# Patient Record
Sex: MALE | Race: WHITE | NOT HISPANIC OR LATINO | Employment: FULL TIME | URBAN - NONMETROPOLITAN AREA
[De-identification: names, ages, dates, MRNs, and addresses within clinical notes are randomized per-mention and may not be internally consistent; named-entity substitution may affect disease eponyms.]

---

## 2020-09-09 ENCOUNTER — HOSPITAL ENCOUNTER (OUTPATIENT)
Dept: RADIOLOGY | Facility: HOSPITAL | Age: 50
Discharge: HOME | End: 2020-09-09
Attending: NURSE PRACTITIONER
Payer: MEDICAID

## 2020-09-09 DIAGNOSIS — M54.50 LUMBAGO: ICD-10-CM

## 2020-09-09 DIAGNOSIS — M54.50 LUMBAGO: Primary | ICD-10-CM

## 2020-09-09 PROCEDURE — 72100 X-RAY EXAM L-S SPINE 2/3 VWS: CPT | Mod: TC

## 2021-02-23 ENCOUNTER — HOSPITAL ENCOUNTER (OUTPATIENT)
Dept: RADIOLOGY | Facility: HOSPITAL | Age: 51
Discharge: HOME | End: 2021-02-23
Attending: FAMILY MEDICINE
Payer: MEDICAID

## 2021-02-23 DIAGNOSIS — M54.12 CERVICAL RADICULITIS: ICD-10-CM

## 2021-02-23 DIAGNOSIS — M54.12 CERVICAL RADICULITIS: Primary | ICD-10-CM

## 2021-02-23 PROCEDURE — 73030 X-RAY EXAM OF SHOULDER: CPT | Mod: TC,LT

## 2021-02-23 PROCEDURE — 72050 X-RAY EXAM NECK SPINE 4/5VWS: CPT | Mod: TC

## 2021-11-29 ENCOUNTER — LAB VISIT (OUTPATIENT)
Dept: LAB | Facility: HOSPITAL | Age: 51
End: 2021-11-29
Attending: NURSE PRACTITIONER
Payer: MEDICAID

## 2021-11-29 DIAGNOSIS — B82.9 INTESTINAL PARASITISM, UNSPECIFIED: ICD-10-CM

## 2021-11-29 DIAGNOSIS — R10.84 ABDOMINAL PAIN, GENERALIZED: Primary | ICD-10-CM

## 2021-11-29 PROCEDURE — 87427 SHIGA-LIKE TOXIN AG IA: CPT | Mod: 59 | Performed by: NURSE PRACTITIONER

## 2021-11-29 PROCEDURE — 87449 NOS EACH ORGANISM AG IA: CPT | Performed by: NURSE PRACTITIONER

## 2021-11-29 PROCEDURE — 87324 CLOSTRIDIUM AG IA: CPT | Mod: 59 | Performed by: NURSE PRACTITIONER

## 2021-11-29 PROCEDURE — 87045 FECES CULTURE AEROBIC BACT: CPT | Performed by: NURSE PRACTITIONER

## 2021-11-29 PROCEDURE — 87046 STOOL CULTR AEROBIC BACT EA: CPT | Performed by: NURSE PRACTITIONER

## 2021-11-29 PROCEDURE — 87493 C DIFF AMPLIFIED PROBE: CPT | Performed by: NURSE PRACTITIONER

## 2021-11-29 PROCEDURE — 87209 SMEAR COMPLEX STAIN: CPT | Performed by: NURSE PRACTITIONER

## 2021-11-29 PROCEDURE — 87177 OVA AND PARASITES SMEARS: CPT | Performed by: NURSE PRACTITIONER

## 2021-11-30 LAB
C DIFF GDH STL QL: POSITIVE
C DIFF TOX A+B STL QL IA: NEGATIVE
C DIFF TOX GENS STL QL NAA+PROBE: POSITIVE

## 2021-12-01 LAB
E COLI SXT1 STL QL IA: NEGATIVE
E COLI SXT2 STL QL IA: NEGATIVE
O+P STL MICRO: NORMAL

## 2021-12-03 LAB — BACTERIA STL CULT: NORMAL

## 2022-08-20 ENCOUNTER — HOSPITAL ENCOUNTER (EMERGENCY)
Facility: HOSPITAL | Age: 52
Discharge: HOME | End: 2022-08-20
Attending: STUDENT IN AN ORGANIZED HEALTH CARE EDUCATION/TRAINING PROGRAM
Payer: MEDICAID

## 2022-08-20 VITALS
WEIGHT: 185 LBS | SYSTOLIC BLOOD PRESSURE: 141 MMHG | RESPIRATION RATE: 16 BRPM | BODY MASS INDEX: 27.4 KG/M2 | HEIGHT: 69 IN | OXYGEN SATURATION: 97 % | DIASTOLIC BLOOD PRESSURE: 91 MMHG | TEMPERATURE: 99 F | HEART RATE: 82 BPM

## 2022-08-20 DIAGNOSIS — M54.31 SCIATICA OF RIGHT SIDE: Primary | ICD-10-CM

## 2022-08-20 PROCEDURE — 25000003 PHARM REV CODE 250: Performed by: CLINICAL NURSE SPECIALIST

## 2022-08-20 PROCEDURE — 96372 THER/PROPH/DIAG INJ SC/IM: CPT | Performed by: CLINICAL NURSE SPECIALIST

## 2022-08-20 PROCEDURE — 99284 EMERGENCY DEPT VISIT MOD MDM: CPT

## 2022-08-20 PROCEDURE — 63600175 PHARM REV CODE 636 W HCPCS: Performed by: CLINICAL NURSE SPECIALIST

## 2022-08-20 RX ORDER — CYCLOBENZAPRINE HCL 10 MG
10 TABLET ORAL 3 TIMES DAILY PRN
Qty: 15 TABLET | Refills: 0 | Status: SHIPPED | OUTPATIENT
Start: 2022-08-20 | End: 2022-08-20 | Stop reason: SDUPTHER

## 2022-08-20 RX ORDER — KETOROLAC TROMETHAMINE 10 MG/1
10 TABLET, FILM COATED ORAL EVERY 6 HOURS
Qty: 20 TABLET | Refills: 0 | Status: SHIPPED | OUTPATIENT
Start: 2022-08-20 | End: 2022-08-25

## 2022-08-20 RX ORDER — DEXAMETHASONE SODIUM PHOSPHATE 4 MG/ML
8 INJECTION, SOLUTION INTRA-ARTICULAR; INTRALESIONAL; INTRAMUSCULAR; INTRAVENOUS; SOFT TISSUE
Status: COMPLETED | OUTPATIENT
Start: 2022-08-20 | End: 2022-08-20

## 2022-08-20 RX ORDER — TIZANIDINE 4 MG/1
4 TABLET ORAL EVERY 6 HOURS PRN
Qty: 15 TABLET | Refills: 0 | Status: SHIPPED | OUTPATIENT
Start: 2022-08-20 | End: 2022-08-30

## 2022-08-20 RX ORDER — KETOROLAC TROMETHAMINE 30 MG/ML
60 INJECTION, SOLUTION INTRAMUSCULAR; INTRAVENOUS
Status: COMPLETED | OUTPATIENT
Start: 2022-08-20 | End: 2022-08-20

## 2022-08-20 RX ORDER — KETOROLAC TROMETHAMINE 10 MG/1
10 TABLET, FILM COATED ORAL EVERY 6 HOURS
Qty: 20 TABLET | Refills: 0 | Status: SHIPPED | OUTPATIENT
Start: 2022-08-20 | End: 2022-08-20 | Stop reason: SDUPTHER

## 2022-08-20 RX ORDER — METHOCARBAMOL 500 MG/1
1000 TABLET, FILM COATED ORAL
Status: COMPLETED | OUTPATIENT
Start: 2022-08-20 | End: 2022-08-20

## 2022-08-20 RX ORDER — DICLOFENAC SODIUM 50 MG/1
50 TABLET, DELAYED RELEASE ORAL 2 TIMES DAILY
COMMUNITY

## 2022-08-20 RX ORDER — GABAPENTIN 800 MG/1
800 TABLET ORAL 3 TIMES DAILY
COMMUNITY

## 2022-08-20 RX ORDER — CYCLOBENZAPRINE HCL 10 MG
10 TABLET ORAL 3 TIMES DAILY PRN
Qty: 15 TABLET | Refills: 0 | Status: SHIPPED | OUTPATIENT
Start: 2022-08-20 | End: 2022-08-20 | Stop reason: ALTCHOICE

## 2022-08-20 RX ADMIN — METHOCARBAMOL 1000 MG: 500 TABLET ORAL at 09:08

## 2022-08-20 RX ADMIN — DEXAMETHASONE SODIUM PHOSPHATE 8 MG: 4 INJECTION, SOLUTION INTRA-ARTICULAR; INTRALESIONAL; INTRAMUSCULAR; INTRAVENOUS; SOFT TISSUE at 09:08

## 2022-08-20 RX ADMIN — KETOROLAC TROMETHAMINE 60 MG: 30 INJECTION, SOLUTION INTRAMUSCULAR at 09:08

## 2022-08-20 NOTE — ED PROVIDER NOTES
Encounter Date: 8/20/2022       History     Chief Complaint   Patient presents with    Leg Pain     Right leg pain starting in back and radiating down right leg below knee. Hx of sciatica, numbness worsened this week and weakness onset yesterday in right leg.      Shayla Frazier is an 51 y.o. male who complains of lower back pain radiating to right lower extremity.  Patient has a history of chronic sciatica and spine disease.  Patient requesting Ultram for his pain.  Ambulating without any difficulty.        Review of patient's allergies indicates:  No Known Allergies  Past Medical History:   Diagnosis Date    Sciatic leg pain     Spinal disease      No past surgical history on file.  No family history on file.     Review of Systems   Constitutional: Negative for fever.   HENT: Negative for sore throat.    Respiratory: Negative for shortness of breath.    Cardiovascular: Negative for chest pain.   Gastrointestinal: Negative for nausea.   Genitourinary: Negative for dysuria.   Musculoskeletal: Positive for back pain.   Skin: Negative for rash.   Neurological: Negative for weakness.   Hematological: Does not bruise/bleed easily.   All other systems reviewed and are negative.      Physical Exam     Initial Vitals [08/20/22 0923]   BP Pulse Resp Temp SpO2   (!) 163/107 94 (!) 22 98.6 °F (37 °C) 97 %      MAP       --         Physical Exam    Nursing note and vitals reviewed.  Constitutional: He appears well-developed and well-nourished.   HENT:   Head: Normocephalic and atraumatic.   Eyes: Pupils are equal, round, and reactive to light.   Cardiovascular: Normal rate and regular rhythm.   Pulmonary/Chest: Breath sounds normal.   Abdominal: Abdomen is soft. Bowel sounds are normal.   Musculoskeletal:         General: Normal range of motion.     Neurological: He is alert and oriented to person, place, and time.   Psychiatric: He has a normal mood and affect.         ED Course   Procedures  Labs Reviewed - No data to  display       Imaging Results    None          Medications   methocarbamoL tablet 1,000 mg (1,000 mg Oral Given 8/20/22 0935)   ketorolac injection 60 mg (60 mg Intramuscular Given 8/20/22 0935)   dexamethasone injection 8 mg (8 mg Intramuscular Given 8/20/22 0936)     Medical Decision Making:   Differential Diagnosis:   Sciatica, chronic pain syndrome, drug-seeking behavior                      Clinical Impression:   Final diagnoses:  [M54.31] Sciatica of right side (Primary)          ED Disposition Condition    Discharge Stable        ED Prescriptions     Medication Sig Dispense Start Date End Date Auth. Provider    cyclobenzaprine (FLEXERIL) 10 MG tablet  (Status: Discontinued) Take 1 tablet (10 mg total) by mouth 3 (three) times daily as needed for Muscle spasms. 15 tablet 8/20/2022 8/20/2022 Brianna Gomez NP    ketorolac (TORADOL) 10 mg tablet  (Status: Discontinued) Take 1 tablet (10 mg total) by mouth every 6 (six) hours. for 5 days 20 tablet 8/20/2022 8/20/2022 Brianna Gomez NP    cyclobenzaprine (FLEXERIL) 10 MG tablet  (Status: Discontinued) Take 1 tablet (10 mg total) by mouth 3 (three) times daily as needed for Muscle spasms. 15 tablet 8/20/2022 8/20/2022 Brianna Gomez NP    ketorolac (TORADOL) 10 mg tablet Take 1 tablet (10 mg total) by mouth every 6 (six) hours. for 5 days 20 tablet 8/20/2022 8/25/2022 Brianna Gomez NP    tiZANidine (ZANAFLEX) 4 MG tablet Take 1 tablet (4 mg total) by mouth every 6 (six) hours as needed (back pain). 15 tablet 8/20/2022 8/30/2022 Brianna Gomez NP        Follow-up Information     Follow up With Specialties Details Why Contact Info    Kierra Gomez NP Family Medicine  As needed [facility] [address]  [phone]             Brianna Gomez NP  08/20/22 6539

## 2023-05-31 ENCOUNTER — HOSPITAL ENCOUNTER (EMERGENCY)
Facility: HOSPITAL | Age: 53
Discharge: HOME | End: 2023-05-31
Attending: EMERGENCY MEDICINE
Payer: MEDICAID

## 2023-05-31 VITALS
HEIGHT: 69 IN | SYSTOLIC BLOOD PRESSURE: 118 MMHG | WEIGHT: 186 LBS | BODY MASS INDEX: 27.55 KG/M2 | HEART RATE: 69 BPM | DIASTOLIC BLOOD PRESSURE: 79 MMHG | TEMPERATURE: 98 F | OXYGEN SATURATION: 99 % | RESPIRATION RATE: 18 BRPM

## 2023-05-31 DIAGNOSIS — M54.2 NECK PAIN: Primary | ICD-10-CM

## 2023-05-31 PROCEDURE — 25000003 PHARM REV CODE 250: Performed by: NURSE PRACTITIONER

## 2023-05-31 PROCEDURE — 99284 EMERGENCY DEPT VISIT MOD MDM: CPT | Mod: 25

## 2023-05-31 RX ORDER — PANTOPRAZOLE SODIUM 20 MG/1
20 TABLET, DELAYED RELEASE ORAL
COMMUNITY
Start: 2023-03-29

## 2023-05-31 RX ORDER — ONDANSETRON 4 MG/1
8 TABLET, ORALLY DISINTEGRATING ORAL
Status: COMPLETED | OUTPATIENT
Start: 2023-05-31 | End: 2023-05-31

## 2023-05-31 RX ORDER — OXYCODONE AND ACETAMINOPHEN 5; 325 MG/1; MG/1
1 TABLET ORAL EVERY 8 HOURS PRN
Qty: 9 TABLET | Refills: 0 | Status: SHIPPED | OUTPATIENT
Start: 2023-05-31 | End: 2023-06-03

## 2023-05-31 RX ORDER — EZETIMIBE 10 MG/1
10 TABLET ORAL
COMMUNITY
Start: 2023-05-06

## 2023-05-31 RX ORDER — LOSARTAN POTASSIUM 100 MG/1
100 TABLET ORAL
COMMUNITY
Start: 2023-05-12

## 2023-05-31 RX ORDER — METHOCARBAMOL 500 MG/1
1000 TABLET, FILM COATED ORAL 2 TIMES DAILY
COMMUNITY
Start: 2023-04-23

## 2023-05-31 RX ORDER — OXYCODONE AND ACETAMINOPHEN 5; 325 MG/1; MG/1
2 TABLET ORAL
Status: COMPLETED | OUTPATIENT
Start: 2023-05-31 | End: 2023-05-31

## 2023-05-31 RX ORDER — PRAVASTATIN SODIUM 80 MG/1
80 TABLET ORAL NIGHTLY
COMMUNITY
Start: 2023-05-22

## 2023-05-31 RX ADMIN — OXYCODONE HYDROCHLORIDE AND ACETAMINOPHEN 2 TABLET: 5; 325 TABLET ORAL at 10:05

## 2023-05-31 RX ADMIN — ONDANSETRON 8 MG: 4 TABLET, ORALLY DISINTEGRATING ORAL at 10:05

## 2023-05-31 NOTE — ED PROVIDER NOTES
"Encounter Date: 5/31/2023       History     Chief Complaint   Patient presents with    Neck Pain     Complains of neck pain post slip and fall 2 days ago. Reports tingling sensation in left arm. Hx of neck problems/surgery.      This is a 52-year-old white male with a history of low back pain and cervical disc disease, roughly 3 months status post posterior cervical fusion who presents to the emergency department with complaints of neck pain after sustaining a fall 2 days ago.  Patient reports slipping and water and falling forward, landing on hands and knees.  He reports pain to the lower aspect of the posterior neck that is constant and exacerbated by movement.  He also reports hearing a "click sound" with flexion of the neck.  He also states that he is now experiencing new onset numbness/tingling to the left upper extremity which had initially resolved after surgery.  He denies any other injuries or concerns at this time.  He is taking gabapentin and Tylenol without improvement in symptoms.  He was instructed by his surgeon not to begin anti-inflammatories until November.    Review of patient's allergies indicates:  No Known Allergies  Past Medical History:   Diagnosis Date    Sciatic leg pain     Spinal disease      No past surgical history on file.  No family history on file.     Review of Systems   Musculoskeletal:  Positive for neck pain.   Neurological:  Positive for numbness.     Physical Exam     Initial Vitals   BP Pulse Resp Temp SpO2   05/31/23 0955 05/31/23 0954 05/31/23 0954 05/31/23 0957 05/31/23 0954   (!) 137/101 81 18 98.4 °F (36.9 °C) 99 %      MAP       --                Physical Exam    Nursing note and vitals reviewed.  Constitutional: He appears well-developed and well-nourished. He is active. No distress.   HENT:   Head: Normocephalic and atraumatic.   Eyes: EOM are normal. Pupils are equal, round, and reactive to light.   Neck: Neck supple.   Healthy-appearing scar on posterior neck. Tender " to palp over lower cervical spine/t-1. Muscle strength bue 5/5. Limited flexion/extension.   Cardiovascular:  Normal rate, regular rhythm and normal heart sounds.           Pulmonary/Chest: Breath sounds normal. No respiratory distress.   Musculoskeletal:      Cervical back: Neck supple.     Neurological: He is alert and oriented to person, place, and time. He has normal strength. GCS score is 15. GCS eye subscore is 4. GCS verbal subscore is 5. GCS motor subscore is 6.   Skin: Skin is warm and dry. Capillary refill takes less than 2 seconds.   Psychiatric: He has a normal mood and affect. His behavior is normal. Thought content normal.       ED Course   Procedures  Labs Reviewed - No data to display       Imaging Results              CT Cervical Spine Without Contrast (Final result)  Result time 05/31/23 11:09:59      Final result by Burt Torres MD (05/31/23 11:09:59)                   Impression:      Multilevel postoperative and degenerative changes of the cervical spine.    No evidence of an acute cervical spine fracture.      Electronically signed by: Burt Torres MD  Date:    05/31/2023  Time:    11:09               Narrative:    EXAMINATION:  CT CERVICAL SPINE WITHOUT CONTRAST    CLINICAL HISTORY:  Neck trauma, focal neuro deficit or paresthesia (Age 16-64y);    CT/nuclear cardiac exams in previous 12 months: None    TECHNIQUE:  Axial CT images were obtained and evaluated with multiplanar reformatted images.  Iterative reconstruction technique was used.    COMPARISON:  None    FINDINGS:  There is posterior fusion hardware which extends from C3-T1 with multilevel laminectomy change.  Osseous fusion noted of the C4 and C5 bodies.  Disc space loss is noted at the remaining cervical levels with mild associated osteophyte formation.  There appears to be a slight grade 1 anterolisthesis of C3 over C4.  No acute fracture identified.  Craniocervical junction appears intact.  Prevertebral soft tissues are within  normal limits.                                       Medications   oxyCODONE-acetaminophen 5-325 mg per tablet 2 tablet (2 tablets Oral Given 5/31/23 1005)   ondansetron disintegrating tablet 8 mg (8 mg Oral Given 5/31/23 1005)                 ED Course as of 05/31/23 1120   Wed May 31, 2023   1118 No acute findings on CT of cervical spine.  Patient to follow-up with PCP for further evaluation if symptoms persist. [CB]      ED Course User Index  [CB] Love Arevalo NP                 Clinical Impression:   Final diagnoses:  [M54.2] Neck pain (Primary)        ED Disposition Condition    Discharge Stable          ED Prescriptions       Medication Sig Dispense Start Date End Date Auth. Provider    oxyCODONE-acetaminophen (PERCOCET) 5-325 mg per tablet Take 1 tablet by mouth every 8 (eight) hours as needed for Pain. 9 tablet 5/31/2023 6/3/2023 Love Arevalo NP          Follow-up Information       Follow up With Specialties Details Why Contact Info    Kierra Gomez NP Family Medicine Schedule an appointment as soon as possible for a visit in 2 days for re-evaluation of today's complaint [facility] [address]  [phone]               Love Arevalo NP  05/31/23 1120

## 2023-05-31 NOTE — Clinical Note
"Shayla Lorenz" Karin was seen and treated in our emergency department on 5/31/2023.  He may return to work on 06/02/2023.       If you have any questions or concerns, please don't hesitate to call.      [facility]    "

## 2023-05-31 NOTE — DISCHARGE INSTRUCTIONS
Take pain medication as directed.  Follow-up with your surgeon for further evaluation of your symptoms.  Return to the emergency department for worsening condition.

## 2025-02-26 ENCOUNTER — HOSPITAL ENCOUNTER (EMERGENCY)
Facility: HOSPITAL | Age: 55
Discharge: HOME | End: 2025-02-26
Attending: EMERGENCY MEDICINE
Payer: MEDICAID

## 2025-02-26 VITALS
HEIGHT: 66 IN | OXYGEN SATURATION: 99 % | DIASTOLIC BLOOD PRESSURE: 99 MMHG | TEMPERATURE: 98 F | BODY MASS INDEX: 28.84 KG/M2 | RESPIRATION RATE: 18 BRPM | SYSTOLIC BLOOD PRESSURE: 155 MMHG | HEART RATE: 76 BPM | WEIGHT: 179.44 LBS

## 2025-02-26 DIAGNOSIS — G89.29 CHRONIC NECK PAIN: Primary | ICD-10-CM

## 2025-02-26 DIAGNOSIS — M54.2 CHRONIC NECK PAIN: Primary | ICD-10-CM

## 2025-02-26 DIAGNOSIS — L03.90 CELLULITIS OF SKIN: ICD-10-CM

## 2025-02-26 PROCEDURE — 99284 EMERGENCY DEPT VISIT MOD MDM: CPT

## 2025-02-26 RX ORDER — OXYCODONE AND ACETAMINOPHEN 5; 325 MG/1; MG/1
1 TABLET ORAL EVERY 6 HOURS PRN
Qty: 12 TABLET | Refills: 0 | Status: SHIPPED | OUTPATIENT
Start: 2025-02-26

## 2025-02-26 RX ORDER — CEPHALEXIN 500 MG/1
500 CAPSULE ORAL 2 TIMES DAILY
Qty: 14 CAPSULE | Refills: 0 | Status: SHIPPED | OUTPATIENT
Start: 2025-02-26 | End: 2025-03-05

## 2025-02-26 NOTE — ED PROVIDER NOTES
[facility] - EMERGENCY DEPARTMENT  EMERGENCY DEPARTMENT ENCOUNTER    Pt Name:  Shayla Frazier  MRN:  [id number]  YOB: 1970  Date of evaluation: 2/26/2025  Provider: Phil Moore MD      CHIEF COMPLAINT:     Chief Complaint   Patient presents with    Neck Pain     Pt stated he has been experiencing posterior head / neck pain for the past few days. Hx spinal fusion c2-t1. Denied recent trauma / injury. Referred to ED by Dr. Guzman.        HPI history provided by the patient.    HISTORY IF PRESENT ILLNESS:  (location/symptom, timing/onset, context/setting, quality, duration, modifying factors, severity)   Note limiting factors.     Shayla Frazier is a 54 y.o. male who presents to the emergency department for neck pain.  Pt is to see his NSGY in a week.  His mom walked past him and noted that his neck was red.  She told to him to go see his PCP.  Pt got over there and was told to come to the ED by the .  He has a hx of cervical fusion.  He has been having ongoing neck pain for the past 6 wks or so.  The pain is over the area where the redness is at.  He has pain over the left side of his neck.  His ROM is reduced, but there is nothing worse.  He tells me that he has not had any burning or weakness in his UEs.      Nursing notes were reviewed    REVIEW OF SYSTEMS:     Review of Systems   Constitutional:  Negative for fever.   Musculoskeletal:  Positive for neck pain and neck stiffness.   Skin:  Positive for rash (left neck).       PAST MEDICAL HISTORY:     Past Medical History:   Diagnosis Date    Sciatic leg pain     Spinal disease        SURGICAL HISTORY:     History reviewed. No pertinent surgical history.    CURRENT MEDICATIONS:     Previous Medications    DICLOFENAC (VOLTAREN) 50 MG EC TABLET    Take 50 mg by mouth 2 (two) times daily.    EZETIMIBE (ZETIA) 10 MG TABLET    Take 10 mg by mouth.    GABAPENTIN (NEURONTIN) 800 MG TABLET    Take 800 mg by mouth 3 (three) times daily.    LOSARTAN  (COZAAR) 100 MG TABLET    Take 100 mg by mouth.    METHOCARBAMOL (ROBAXIN) 500 MG TAB    Take 1,000 mg by mouth 2 (two) times daily.    PRAVASTATIN (PRAVACHOL) 80 MG TABLET    Take 80 mg by mouth every evening.    PROTONIX 20 MG TABLET    Take 20 mg by mouth.       ALLERGIES:     Clindamycin    SOCIAL HISTORY:     Social History     Socioeconomic History    Marital status: Single   Tobacco Use    Smoking status: Never     Passive exposure: Never    Smokeless tobacco: Never       SCREENINGS:           PHYSICAL EXAM:     ED Triage Vitals [02/26/25 0900]   BP (!) 155/99   Pulse 76   Resp 18   Temp 98.2 °F (36.8 °C)   SpO2 99 %        Physical Exam  Vitals and nursing note reviewed.   Constitutional:       Appearance: Normal appearance. He is not ill-appearing.   HENT:      Head: Normocephalic and atraumatic.        Comments: Patient has mild left paraspinous tenderness to palpation.  There is some minor redness to the left side of his neck as well.  No lesions are noted.  Range of motion of his neck is limited, but this is not new or different for him.  Skin:     General: Skin is warm and dry.      Capillary Refill: Capillary refill takes less than 2 seconds.      Findings: Erythema present. No ecchymosis, signs of injury, lesion or wound.   Neurological:      General: No focal deficit present.      Mental Status: He is alert and oriented to person, place, and time.      Cranial Nerves: No cranial nerve deficit.      Sensory: No sensory deficit.      Motor: No weakness.   Psychiatric:         Mood and Affect: Mood normal.         DIAGNOSTIC RESULTS:           RADIOLOGY:  Non plain film images such as CT, ultrasound and MRI are read by the radiologist.  Plain radiographic images were visualized and preliminarily interpreted by the emergency physician below findings:        No orders to display           LABS:  Labs Reviewed - No data to display    All other labs were within normal range her not returned as of this  "dictation.    EMERGENCY DEPARTMENT COURSE IN DIFFERENTIAL DIAGNOSIS/MDM:     Vitals:   Vitals:    02/26/25 0857 02/26/25 0900 02/26/25 0901   BP:  (!) 155/99    Pulse:  76    Resp:  18    Temp:  98.2 °F (36.8 °C)    SpO2:  99%    Weight: 81.4 kg (179 lb 7.3 oz)     Height:   5' 6" (1.676 m)        Medical Decision Making  Patient presented to the ED with acute on chronic neck pain.  He has been dealing with for the past 6 weeks.  He tells me he attempted to get in his primary care, but was just sent over here by the .  He has not been in this ED since May.  His most recent prescription for narcotic was a month ago.  I am going to give him the benefit of the doubt and after review of the  go ahead and give him 3 days of oxycodone 5/325 for his pain.  He is supposed to see his neurosurgeon in about 9 days.  Patient is not experiencing any neurologic deficit in his upper extremities.  He does have some redness to the back of his neck.  I do not believe it to be a cellulitis.  It looks a lot like as if he has irritated the skin from pushing on the area of his neck that has been bothering him.  He has no fevers.  Overall, I think the redness on his neck is relatively benign.  After speaking with the patient I am going to go ahead and write him a script for Keflex.  We discussed about filling the script versus not feeling the script.  He is going to basically do a wait and see.  If he feels like the redness of the area gets worse he is going to go ahead and take the medicine if it does not and it clears up on its own then at that point he will not worry about the medication.  After careful review of history, physical, and supporting data, there is very low suspicion for a life-threatening or limb-threatening cause of the patient's symptoms.  The patient's symptoms have improved from presentation and appears clinically well.  Patient was reexamined prior to discharge and appears to be clinically improved.  " Patient has been encouraged to follow up with his/her PCP and to return to the ED with any lack of improvement or worsening symptoms.  The patient's questions regarding the workup and plan were invited and answered.  The patient/guardian states understanding and agreement with the discharge planning.        Risk  Prescription drug management.         Reassessment:          Medications - No data to display    CONSULTS:  None  Unless otherwise noted below, none.    Procedures      Additional MDM:   Differential Diagnosis:   Differential diagnoses include, but not limited to epidural abscess, cellulitis, muscle spasm, hardware degeneration, spinal stenosis, radiculopathy           FINAL IMPRESSION:     1. Chronic neck pain    2. Cellulitis of skin         DISPOSITION/PLAN:      ED Disposition Condition    Discharge Stable            PATIENT REFERRED TO:    Lamont Guzman MD  [address] [place] [address]  [phone]    Call in 3 days  As needed      DISCHARGE MEDICATIONS:  New Prescriptions    CEPHALEXIN (KEFLEX) 500 MG CAPSULE    Take 1 capsule (500 mg total) by mouth 2 (two) times a day. for 7 days    OXYCODONE-ACETAMINOPHEN (PERCOCET) 5-325 MG PER TABLET    Take 1 tablet by mouth every 6 (six) hours as needed for Pain.           (Please note that portions of this chart were completed with the voice recognition program.  Efforts were made to edit the dictations but occasionally words are mis-transcribed.)    Phil Moore MD (electronically signed) Emergency Physician                                 Phil Moore MD  02/26/25 [id number]